# Patient Record
Sex: FEMALE | Race: WHITE | HISPANIC OR LATINO | URBAN - METROPOLITAN AREA
[De-identification: names, ages, dates, MRNs, and addresses within clinical notes are randomized per-mention and may not be internally consistent; named-entity substitution may affect disease eponyms.]

---

## 2019-03-18 ENCOUNTER — APPOINTMENT (RX ONLY)
Dept: URBAN - METROPOLITAN AREA CLINIC 61 | Facility: CLINIC | Age: 29
Setting detail: DERMATOLOGY
End: 2019-03-18

## 2019-03-18 DIAGNOSIS — L72.0 EPIDERMAL CYST: ICD-10-CM

## 2019-03-18 PROCEDURE — 11900 INJECT SKIN LESIONS </W 7: CPT

## 2019-03-18 PROCEDURE — ? INTRALESIONAL KENALOG

## 2019-03-18 PROCEDURE — ? PRESCRIPTION

## 2019-03-18 PROCEDURE — ? COUNSELING

## 2019-03-18 PROCEDURE — ? DEFER

## 2019-03-18 RX ORDER — DOXYCYCLINE HYCLATE 100 MG/1
CAPSULE, GELATIN COATED ORAL
Qty: 14 | Refills: 0 | Status: ERX | COMMUNITY
Start: 2019-03-18

## 2019-03-18 RX ADMIN — DOXYCYCLINE HYCLATE: 100 CAPSULE, GELATIN COATED ORAL at 20:27

## 2019-03-18 ASSESSMENT — LOCATION ZONE DERM: LOCATION ZONE: FACE

## 2019-03-18 ASSESSMENT — LOCATION DETAILED DESCRIPTION DERM: LOCATION DETAILED: LEFT SUPERIOR CENTRAL BUCCAL CHEEK

## 2019-03-18 ASSESSMENT — LOCATION SIMPLE DESCRIPTION DERM: LOCATION SIMPLE: LEFT CHEEK

## 2019-03-18 NOTE — PROCEDURE: INTRALESIONAL KENALOG
Administered By (Optional): Tai Cantu MD
Concentration Of Solution Injected (Mg/Ml): 2.5
Include Z78.9 (Other Specified Conditions Influencing Health Status) As An Associated Diagnosis?: No
Consent: The risks of atrophy were reviewed with the patient.
Lot # (Optional): [id number]
X Size Of Lesion In Cm (Optional): 0
Expiration Date (Optional): 06/2020
Kenalog Preparation: Kenalog
Detail Level: Detailed
Medical Necessity Clause: This procedure was medically necessary because the lesions that were treated were:
Total Volume Injected (Ccs- Only Use Numbers And Decimals): 0.5

## 2019-03-18 NOTE — PROCEDURE: DEFER
Detail Level: Detailed
Introduction Text (Please End With A Colon): The following procedure was deferred:if symptoms persist refer to Olayinka Mcnamara, DO for eval and treatment
Procedure To Be Performed At Next Visit: Excision

## 2020-07-15 ENCOUNTER — APPOINTMENT (RX ONLY)
Dept: URBAN - METROPOLITAN AREA CLINIC 142 | Facility: CLINIC | Age: 30
Setting detail: DERMATOLOGY
End: 2020-07-15

## 2020-07-15 DIAGNOSIS — L83 ACANTHOSIS NIGRICANS: ICD-10-CM

## 2020-07-15 PROCEDURE — ? TREATMENT REGIMEN

## 2020-07-15 PROCEDURE — 99213 OFFICE O/P EST LOW 20 MIN: CPT

## 2020-07-15 PROCEDURE — ? COUNSELING

## 2020-07-15 ASSESSMENT — LOCATION ZONE DERM
LOCATION ZONE: LEG
LOCATION ZONE: AXILLAE

## 2020-07-15 ASSESSMENT — LOCATION SIMPLE DESCRIPTION DERM
LOCATION SIMPLE: RIGHT THIGH
LOCATION SIMPLE: LEFT THIGH
LOCATION SIMPLE: RIGHT AXILLARY VAULT
LOCATION SIMPLE: LEFT AXILLARY VAULT

## 2020-07-15 ASSESSMENT — LOCATION DETAILED DESCRIPTION DERM
LOCATION DETAILED: LEFT ANTERIOR MEDIAL PROXIMAL THIGH
LOCATION DETAILED: RIGHT AXILLARY VAULT
LOCATION DETAILED: RIGHT ANTERIOR MEDIAL PROXIMAL THIGH
LOCATION DETAILED: LEFT AXILLARY VAULT

## 2020-07-15 NOTE — PROCEDURE: COUNSELING
Detail Level: Zone
Performing Laboratory: [id number]
Bill For Surgical Tray: no
Billing Type: Third-Party Bill
Expected Date Of Service: 07/15/2020
Clinical Notes (To The Lab): Labs Ordered: Insulin
Lab Facility: [id number]

## 2020-07-15 NOTE — PROCEDURE: TREATMENT REGIMEN
Otc Regimen: Amlactin, CeraVe Psoriasis or Lachydren
Detail Level: Simple
Samples Given: Amlactin\\nCeraVe Psoriasis